# Patient Record
(demographics unavailable — no encounter records)

---

## 2024-10-17 NOTE — DISCUSSION/SUMMARY
[___ Month(s)] : in [unfilled] month(s) [FreeTextEntry1] : Stay on lisinopril 5 mg daily for blood pressure management and omega-3 for lipid lowering.  Heart healthy diet and lifestyle again encouraged.  echo unremarkable f/u in 6 months [EKG obtained to assist in diagnosis and management of assessed problem(s)] : EKG obtained to assist in diagnosis and management of assessed problem(s)

## 2024-10-17 NOTE — HISTORY OF PRESENT ILLNESS
[FreeTextEntry1] : Kyle comes to the office today for cardiac checkup.  He is a 59-year-old with hypertension, hypertriglyceridemia, developmental delay, and minor T wave abnormalities on ECG which is stable.  No current chest symptoms reported. EKG: sinus with PVC  4/18/24: echo unremarkable  10/17/24 feeling well; not verbalizing any complaints EKG with no acute ischemic changes

## 2024-10-17 NOTE — REASON FOR VISIT
[Follow-Up - Clinic] : a clinic follow-up of [Abnormal ECG] : an abnormal ECG [Hypertension] : hypertension [Formal Caregiver] : formal caregiver

## 2025-03-11 NOTE — HISTORY OF PRESENT ILLNESS
[FreeTextEntry1] : Kyle comes to the office today for cardiac checkup.  He is a 59-year-old with hypertension, hypertriglyceridemia, developmental delay, and minor T wave abnormalities on ECG which is stable.  No current chest symptoms reported. EKG: sinus with PVC  4/18/24: echo unremarkable  10/17/24 feeling well; not verbalizing any complaints EKG with no acute ischemic changes  3/11/25: doing well no cardiac complaints by pt or caregiver EKG normal Echo 2024 normal

## 2025-03-11 NOTE — DISCUSSION/SUMMARY
[___ Month(s)] : in [unfilled] month(s) [FreeTextEntry1] : Stay on lisinopril 5 mg daily for blood pressure management and omega-3 for lipid lowering - refills sent.  Heart healthy diet and lifestyle again encouraged.  echo unremarkable f/u in 6 months [EKG obtained to assist in diagnosis and management of assessed problem(s)] : EKG obtained to assist in diagnosis and management of assessed problem(s)